# Patient Record
(demographics unavailable — no encounter records)

---

## 2024-08-11 NOTE — COUNSELING
[Yes] : Risk of tobacco use and health benefits of smoking cessation discussed: Yes [No] : Not willing to quit smoking [Benefits of weight loss discussed] : Benefits of weight loss discussed [Good understanding] : Patient has a good understanding of disease, goals and obesity follow-up plan

## 2024-10-15 NOTE — HISTORY OF PRESENT ILLNESS
[de-identified] : 58 year old male patient with history of stable Hypertension, Type 2 Diabetes Mellitus, Lumbar Radiculopathy, Cervical Radiculopathy, Insomnia, Cervical Stenosis of Spine, Chondromalacia of Both Knees, Thyroid Nodule, Primary Osteoarthritis of Both Hips, Herniated Cervical Disc, Lumbar Herniated Disc, history as stated, presented for an annual preventative examination and for evaluation of back pains, knee pains B/L, associated with difficulty in ambulation and climbing stairs, presently living in a homeless shelter and unemployed.  Last office visit - 08/12/2023, was unable to come to the office, as counseled.  As per patient: Last employment Date - 2023 Disabled for employment from - 11/08/2023 Unable to use subways. Unable to climb stairs. Reasonable Accommodation at Homeless Shelter.

## 2024-10-15 NOTE — HEALTH RISK ASSESSMENT
[Good] : ~his/her~  mood as  good [No] : In the past 12 months have you used drugs other than those required for medical reasons? No [No falls in past year] : Patient reported no falls in the past year [0] : 2) Feeling down, depressed, or hopeless: Not at all (0) [Current] : Current [NO] : No [Patient declined colonoscopy] : Patient declined colonoscopy [HIV test declined] : HIV test declined [Hepatitis C test offered] : Hepatitis C test offered [None] : None [Feels Safe at Home] : Feels safe at home [Fully functional (bathing, dressing, toileting, transferring, walking, feeding)] : Fully functional (bathing, dressing, toileting, transferring, walking, feeding) [Fully functional (using the telephone, shopping, preparing meals, housekeeping, doing laundry, using] : Fully functional and needs no help or supervision to perform IADLs (using the telephone, shopping, preparing meals, housekeeping, doing laundry, using transportation, managing medications and managing finances) [Smoke Detector] : smoke detector [Carbon Monoxide Detector] : carbon monoxide detector [Seat Belt] :  uses seat belt [Sunscreen] : uses sunscreen [With Patient/Caregiver] : , with patient/caregiver [Reports changes in hearing] : Reports no changes in hearing [Independent] : managing finances [Some assistance needed] : using transportation [Reports changes in vision] : Reports changes in vision [FreeTextEntry1] : Check up\par   [de-identified] : None [[id number]] : 0 [Change in mental status noted] : No change in mental status noted [Reports changes in dental health] : Reports no changes in dental health [ColonoscopyComments] : As ordered for today. [HepatitisCDate] : 02/20 [HepatitisCComments] : Negative [AdvancecareDate] : 10/24

## 2024-10-15 NOTE — HISTORY OF PRESENT ILLNESS
[de-identified] : 58 year old male patient with history of stable Hypertension, Type 2 Diabetes Mellitus, Lumbar Radiculopathy, Cervical Radiculopathy, Insomnia, Cervical Stenosis of Spine, Chondromalacia of Both Knees, Thyroid Nodule, Primary Osteoarthritis of Both Hips, Herniated Cervical Disc, Lumbar Herniated Disc, history as stated, presented for an annual preventative examination and for evaluation of back pains, knee pains B/L, associated with difficulty in ambulation and climbing stairs, presently living in a homeless shelter and unemployed.  Last office visit - 08/12/2023, was unable to come to the office, as counseled.  As per patient: Last employment Date - 2023 Disabled for employment from - 11/08/2023 Unable to use subways. Unable to climb stairs. Reasonable Accommodation at Homeless Shelter.

## 2024-10-15 NOTE — HEALTH RISK ASSESSMENT
[Good] : ~his/her~  mood as  good [No] : In the past 12 months have you used drugs other than those required for medical reasons? No [No falls in past year] : Patient reported no falls in the past year [0] : 2) Feeling down, depressed, or hopeless: Not at all (0) [Current] : Current [NO] : No [Patient declined colonoscopy] : Patient declined colonoscopy [HIV test declined] : HIV test declined [Hepatitis C test offered] : Hepatitis C test offered [None] : None [Feels Safe at Home] : Feels safe at home [Fully functional (bathing, dressing, toileting, transferring, walking, feeding)] : Fully functional (bathing, dressing, toileting, transferring, walking, feeding) [Fully functional (using the telephone, shopping, preparing meals, housekeeping, doing laundry, using] : Fully functional and needs no help or supervision to perform IADLs (using the telephone, shopping, preparing meals, housekeeping, doing laundry, using transportation, managing medications and managing finances) [Smoke Detector] : smoke detector [Carbon Monoxide Detector] : carbon monoxide detector [Seat Belt] :  uses seat belt [Sunscreen] : uses sunscreen [With Patient/Caregiver] : , with patient/caregiver [Reports changes in hearing] : Reports no changes in hearing [Independent] : managing finances [Some assistance needed] : using transportation [Reports changes in vision] : Reports changes in vision [FreeTextEntry1] : Check up\par   [de-identified] : None [[id number]] : 0 [Change in mental status noted] : No change in mental status noted [Reports changes in dental health] : Reports no changes in dental health [ColonoscopyComments] : As ordered for today. [HepatitisCDate] : 02/20 [HepatitisCComments] : Negative [AdvancecareDate] : 10/24

## 2024-10-15 NOTE — REVIEW OF SYSTEMS
[Negative] : Heme/Lymph [Joint Pain] : joint pain [Joint Stiffness] : joint stiffness [Muscle Pain] : muscle pain [Muscle Weakness] : muscle weakness [Back Pain] : back pain [Joint Swelling] : joint swelling [Unsteady Walk] : ataxia

## 2024-10-15 NOTE — HISTORY OF PRESENT ILLNESS
[de-identified] : 58 year old male patient with history of stable Hypertension, Type 2 Diabetes Mellitus, Lumbar Radiculopathy, Cervical Radiculopathy, Insomnia, Cervical Stenosis of Spine, Chondromalacia of Both Knees, Thyroid Nodule, Primary Osteoarthritis of Both Hips, Herniated Cervical Disc, Lumbar Herniated Disc, history as stated, presented for an annual preventative examination and for evaluation of back pains, knee pains B/L, associated with difficulty in ambulation and climbing stairs, presently living in a homeless shelter and unemployed.  Last office visit - 08/12/2023, was unable to come to the office, as counseled.  As per patient: Last employment Date - 2023 Disabled for employment from - 11/08/2023 Unable to use subways. Unable to climb stairs. Reasonable Accommodation at Homeless Shelter.

## 2024-10-15 NOTE — HISTORY OF PRESENT ILLNESS
[de-identified] : 58 year old male patient with history of stable Hypertension, Type 2 Diabetes Mellitus, Lumbar Radiculopathy, Cervical Radiculopathy, Insomnia, Cervical Stenosis of Spine, Chondromalacia of Both Knees, Thyroid Nodule, Primary Osteoarthritis of Both Hips, Herniated Cervical Disc, Lumbar Herniated Disc, history as stated, presented for an annual preventative examination and for evaluation of back pains, knee pains B/L, associated with difficulty in ambulation and climbing stairs, presently living in a homeless shelter and unemployed.  Last office visit - 08/12/2023, was unable to come to the office, as counseled.  As per patient: Last employment Date - 2023 Disabled for employment from - 11/08/2023 Unable to use subways. Unable to climb stairs. Reasonable Accommodation at Homeless Shelter.

## 2024-10-15 NOTE — ASSESSMENT
[FreeTextEntry1] : 58 year old male found to have stable Hypertension, Type 2 Diabetes Mellitus, Lumbar Radiculopathy, Cervical Radiculopathy, Insomnia, Cervical Stenosis of Spine, Chondromalacia of Both Knees, Thyroid Nodule, Primary Osteoarthritis of Both Hips, Herniated Cervical Disc, Lumbar Herniated Disc, with the current prescription regimen as recommended, diet and lifestyle modifications, as counseled. Prior results reviewed, interpreted and discussed with the patient during today's examination, as appropriate. Follow up, treatment plan and tests, as ordered.  Pharmacological intervention for above, as per lab results, as counseled.  Patient is unemployable and disabled as per today's evaluation, as counseled. NEURO/ORTHO/ORTHO-SPINE/OPHTH/ONC Sx F/Us, as directed.  Patient is refusing all immunizations, in spite of counseling risks and benefits.  EKG: Sinus Tachycardia @ 102 BPM. No new acute ST-T changes noted.

## 2024-10-15 NOTE — HEALTH RISK ASSESSMENT
[Good] : ~his/her~  mood as  good [No] : In the past 12 months have you used drugs other than those required for medical reasons? No [No falls in past year] : Patient reported no falls in the past year [0] : 2) Feeling down, depressed, or hopeless: Not at all (0) [Current] : Current [NO] : No [Patient declined colonoscopy] : Patient declined colonoscopy [HIV test declined] : HIV test declined [Hepatitis C test offered] : Hepatitis C test offered [None] : None [Feels Safe at Home] : Feels safe at home [Fully functional (bathing, dressing, toileting, transferring, walking, feeding)] : Fully functional (bathing, dressing, toileting, transferring, walking, feeding) Yes [Fully functional (using the telephone, shopping, preparing meals, housekeeping, doing laundry, using] : Fully functional and needs no help or supervision to perform IADLs (using the telephone, shopping, preparing meals, housekeeping, doing laundry, using transportation, managing medications and managing finances) [Smoke Detector] : smoke detector [Carbon Monoxide Detector] : carbon monoxide detector [Seat Belt] :  uses seat belt [Sunscreen] : uses sunscreen [With Patient/Caregiver] : , with patient/caregiver [Reports changes in hearing] : Reports no changes in hearing [Independent] : managing finances [Some assistance needed] : using transportation [Reports changes in vision] : Reports changes in vision [FreeTextEntry1] : Check up\par   [de-identified] : None [[id number]] : 0 [Change in mental status noted] : No change in mental status noted [Reports changes in dental health] : Reports no changes in dental health [ColonoscopyComments] : As ordered for today. [HepatitisCDate] : 02/20 [HepatitisCComments] : Negative [AdvancecareDate] : 10/24

## 2024-10-15 NOTE — HEALTH RISK ASSESSMENT
[Good] : ~his/her~  mood as  good [No] : In the past 12 months have you used drugs other than those required for medical reasons? No [No falls in past year] : Patient reported no falls in the past year [0] : 2) Feeling down, depressed, or hopeless: Not at all (0) [Current] : Current [NO] : No [Patient declined colonoscopy] : Patient declined colonoscopy [HIV test declined] : HIV test declined [Hepatitis C test offered] : Hepatitis C test offered [None] : None [Feels Safe at Home] : Feels safe at home [Fully functional (bathing, dressing, toileting, transferring, walking, feeding)] : Fully functional (bathing, dressing, toileting, transferring, walking, feeding) [Fully functional (using the telephone, shopping, preparing meals, housekeeping, doing laundry, using] : Fully functional and needs no help or supervision to perform IADLs (using the telephone, shopping, preparing meals, housekeeping, doing laundry, using transportation, managing medications and managing finances) [Smoke Detector] : smoke detector [Carbon Monoxide Detector] : carbon monoxide detector [Seat Belt] :  uses seat belt [Sunscreen] : uses sunscreen [With Patient/Caregiver] : , with patient/caregiver [Reports changes in hearing] : Reports no changes in hearing [Independent] : managing finances [Some assistance needed] : using transportation [Reports changes in vision] : Reports changes in vision [FreeTextEntry1] : Check up\par   [de-identified] : None [[id number]] : 0 [Change in mental status noted] : No change in mental status noted [Reports changes in dental health] : Reports no changes in dental health [ColonoscopyComments] : As ordered for today. [HepatitisCDate] : 02/20 [HepatitisCComments] : Negative [AdvancecareDate] : 10/24

## 2024-11-28 NOTE — DISCUSSION/SUMMARY
[de-identified] : 58y Male with chronic right hip thigh and knee pain. Physical exam with significantly limited internal rotation of the hip. Hip X-ray reveals severe DJD.  Plan: - Discussed CSI. At his stage of arthritis relief is likely limited to a few weeks. Patient endorses understanding and does not wish for CSI at this time. - Physical Therapy to right hip to maximize mobility and strength. - Diclofenac 75mg BID PRN - Referral to Hip replacement surgeon.

## 2024-11-28 NOTE — DISCUSSION/SUMMARY
[de-identified] : 58y Male with chronic right hip thigh and knee pain. Physical exam with significantly limited internal rotation of the hip. Hip X-ray reveals severe DJD.  Plan: - Discussed CSI. At his stage of arthritis relief is likely limited to a few weeks. Patient endorses understanding and does not wish for CSI at this time. - Physical Therapy to right hip to maximize mobility and strength. - Diclofenac 75mg BID PRN - Referral to Hip replacement surgeon.

## 2024-11-28 NOTE — HISTORY OF PRESENT ILLNESS
[de-identified] : 58y M presents with chronic right knee pain and upper thigh pain. He states he was told he would eventually need bilateral knee and hip replacements. The pain has been increasing. He states sometimes his right hip is worse and sometimes his right knee. He states he is unable to turn either of his legs inward and feels he walks with them flared out at all times. States it is very difficult for him to bend down and sit and causes significant discomfort through the front of the bilateral legs. He has done physical therapy on and off for many years most recently 4+ years ago. Had seen Dr. Adler for knee pain in the past where physical therapy was recommended. Was diagnosed with hip and knee arthritis at that time but did not have any other further treatment.

## 2024-11-28 NOTE — HISTORY OF PRESENT ILLNESS
[de-identified] : 58y M presents with chronic right knee pain and upper thigh pain. He states he was told he would eventually need bilateral knee and hip replacements. The pain has been increasing. He states sometimes his right hip is worse and sometimes his right knee. He states he is unable to turn either of his legs inward and feels he walks with them flared out at all times. States it is very difficult for him to bend down and sit and causes significant discomfort through the front of the bilateral legs. He has done physical therapy on and off for many years most recently 4+ years ago. Had seen Dr. Adler for knee pain in the past where physical therapy was recommended. Was diagnosed with hip and knee arthritis at that time but did not have any other further treatment.

## 2024-11-28 NOTE — PHYSICAL EXAM
[de-identified] : ********************Hip and Knee below -- was not present for hip exam, please edit the template*****  Knee Right  Inspection Skin: normal Effusion: normal Bursa swelling: none  Palpation Tenderness: non-tender Location: X  Crepitus: none. Defect: none. Popliteal fullness: negative.  Flexion Active ROM: normal, unable to flex without abduction Passive ROM: normal, unable to flex without abduction  Extension Normal   Straight Leg Raise- can perform Motor strength Flexion: 5/5 Extension: 5/5  Sensory index Normal.  Patellofemoral tests Patellar grind test: positive Patellar apprehension: negative _______________________________________  Hip/leg bilateral  Inspection Normal.  Palpation  Tenderness: none Location: none  Hip ROM Flexion: Limited by 10 degrees Extension: Limited Internal rotation: Limited to 0 degrees External rotation: Limited by 5 degrees   [de-identified] : XR of bilateral hips Date: 11/18/2024  Views: 2 views each Performed at [facility]: Yes, in office Impression: Right greater than left severe osteoarthritis of the hip joint. Left hip joint moderate to severe osteoarthritis with cam and pincer deformities.  These images were personally reviewed with original findings documented as above.

## 2024-11-28 NOTE — HISTORY OF PRESENT ILLNESS
[de-identified] : 58y M presents with chronic right knee pain and upper thigh pain. He states he was told he would eventually need bilateral knee and hip replacements. The pain has been increasing. He states sometimes his right hip is worse and sometimes his right knee. He states he is unable to turn either of his legs inward and feels he walks with them flared out at all times. States it is very difficult for him to bend down and sit and causes significant discomfort through the front of the bilateral legs. He has done physical therapy on and off for many years most recently 4+ years ago. Had seen Dr. Adler for knee pain in the past where physical therapy was recommended. Was diagnosed with hip and knee arthritis at that time but did not have any other further treatment.

## 2024-11-28 NOTE — PHYSICAL EXAM
[de-identified] : ********************Hip and Knee below -- was not present for hip exam, please edit the template*****  Knee Right  Inspection Skin: normal Effusion: normal Bursa swelling: none  Palpation Tenderness: non-tender Location: X  Crepitus: none. Defect: none. Popliteal fullness: negative.  Flexion Active ROM: normal, unable to flex without abduction Passive ROM: normal, unable to flex without abduction  Extension Normal   Straight Leg Raise- can perform Motor strength Flexion: 5/5 Extension: 5/5  Sensory index Normal.  Patellofemoral tests Patellar grind test: positive Patellar apprehension: negative _______________________________________  Hip/leg bilateral  Inspection Normal.  Palpation  Tenderness: none Location: none  Hip ROM Flexion: Limited by 10 degrees Extension: Limited Internal rotation: Limited to 0 degrees External rotation: Limited by 5 degrees   [de-identified] : XR of bilateral hips Date: 11/18/2024  Views: 2 views each Performed at [facility]: Yes, in office Impression: Right greater than left severe osteoarthritis of the hip joint. Left hip joint moderate to severe osteoarthritis with cam and pincer deformities.  These images were personally reviewed with original findings documented as above.

## 2024-11-28 NOTE — DISCUSSION/SUMMARY
[de-identified] : 58y Male with chronic right hip thigh and knee pain. Physical exam with significantly limited internal rotation of the hip. Hip X-ray reveals severe DJD.  Plan: - Discussed CSI. At his stage of arthritis relief is likely limited to a few weeks. Patient endorses understanding and does not wish for CSI at this time. - Physical Therapy to right hip to maximize mobility and strength. - Diclofenac 75mg BID PRN - Referral to Hip replacement surgeon.

## 2024-11-28 NOTE — PHYSICAL EXAM
[de-identified] : ********************Hip and Knee below -- was not present for hip exam, please edit the template*****  Knee Right  Inspection Skin: normal Effusion: normal Bursa swelling: none  Palpation Tenderness: non-tender Location: X  Crepitus: none. Defect: none. Popliteal fullness: negative.  Flexion Active ROM: normal, unable to flex without abduction Passive ROM: normal, unable to flex without abduction  Extension Normal   Straight Leg Raise- can perform Motor strength Flexion: 5/5 Extension: 5/5  Sensory index Normal.  Patellofemoral tests Patellar grind test: positive Patellar apprehension: negative _______________________________________  Hip/leg bilateral  Inspection Normal.  Palpation  Tenderness: none Location: none  Hip ROM Flexion: Limited by 10 degrees Extension: Limited Internal rotation: Limited to 0 degrees External rotation: Limited by 5 degrees   [de-identified] : XR of bilateral hips Date: 11/18/2024  Views: 2 views each Performed at [facility]: Yes, in office Impression: Right greater than left severe osteoarthritis of the hip joint. Left hip joint moderate to severe osteoarthritis with cam and pincer deformities.  These images were personally reviewed with original findings documented as above.

## 2024-12-03 NOTE — PHYSICAL EXAM
[de-identified] : The patient is alert, cooperative, and oriented x 3. Pupils are equal and round. The patient does not have skin rash.   Gait is slow and slightly antalgic. Back ROM is limited with pain. Tenderness at PVM. SLR negative. DTR normal range. Motor and sensory are basically normal throughout bilateral L/E. Circulation of legs is normal. Bladder and bowel functions are normal. [de-identified] : Lumbar spine x-ray taken today 4 views show moderate-severe degenerative disc disease through lumbar spine, no clear instability with flex/ext.

## 2024-12-03 NOTE — HISTORY OF PRESENT ILLNESS
[de-identified] : The patient is 58 years old male who has low back pain for 4 years. No clear radicular leg pain. No clear cause of the symptoms.   Pain Level:  4-5 Duration of Symptoms:  from 2020 Symptom course:  Getting worse Accident:  No   Previous Treatment:    Pain meds:  No    Physical therapy:  Yes, in 2020    Epidural steroid injection:  No

## 2024-12-03 NOTE — CONSULT LETTER
[Dear  ___] : Dear  [unfilled], [Consult Letter:] : I had the pleasure of evaluating your patient, [unfilled]. [Please see my note below.] : Please see my note below. [Consult Closing:] : Thank you very much for allowing me to participate in the care of this patient.  If you have any questions, please do not hesitate to contact me. [Sincerely,] : Sincerely, [FreeTextEntry3] : Bertha Cordova MD PhD

## 2024-12-03 NOTE — DISCUSSION/SUMMARY
[de-identified] : I examined, evaluated, and discussed with the patient. The patient has low back symptoms for 4 years. No clear radicular leg pain.   Based on physical exam and image findings, the patient diagnosis is moderate-severe degenerative disc disease.   Treatment plan is medications PRN and PT. Cyclobenzaprine prescribed. He has pain meds from Dr. Tapia. He is doing knee PT at this point so he will start lumbar PT after that.   The patient understands everything and all questions were answered. The patient will return in 6--8 weeks.

## 2024-12-03 NOTE — RETURN TO WORK/SCHOOL
[Other: ___] : [unfilled] [FreeTextEntry1] : The patient, Chica Flowers, has low back pain and radiographs show lumbar degenerative disc disease and spondylosis. The patient visited spine clinic today (12/03/24). [FreeTextEntry2] : Bertha Cordova MD PhD